# Patient Record
Sex: FEMALE | Race: WHITE | NOT HISPANIC OR LATINO
[De-identification: names, ages, dates, MRNs, and addresses within clinical notes are randomized per-mention and may not be internally consistent; named-entity substitution may affect disease eponyms.]

---

## 2017-01-25 ENCOUNTER — CHART COPY (OUTPATIENT)
Age: 47
End: 2017-01-25

## 2017-02-10 ENCOUNTER — CHART COPY (OUTPATIENT)
Age: 47
End: 2017-02-10

## 2017-03-15 ENCOUNTER — APPOINTMENT (OUTPATIENT)
Dept: MRI IMAGING | Facility: CLINIC | Age: 47
End: 2017-03-15

## 2017-03-15 ENCOUNTER — FORM ENCOUNTER (OUTPATIENT)
Age: 47
End: 2017-03-15

## 2017-03-16 ENCOUNTER — OUTPATIENT (OUTPATIENT)
Dept: OUTPATIENT SERVICES | Facility: HOSPITAL | Age: 47
LOS: 1 days | End: 2017-03-16

## 2017-03-16 ENCOUNTER — APPOINTMENT (OUTPATIENT)
Dept: MRI IMAGING | Facility: HOSPITAL | Age: 47
End: 2017-03-16

## 2017-03-16 DIAGNOSIS — D25.9 LEIOMYOMA OF UTERUS, UNSPECIFIED: ICD-10-CM

## 2017-03-30 ENCOUNTER — APPOINTMENT (OUTPATIENT)
Dept: INTERVENTIONAL RADIOLOGY/VASCULAR | Facility: CLINIC | Age: 47
End: 2017-03-30

## 2017-03-30 VITALS
RESPIRATION RATE: 16 BRPM | OXYGEN SATURATION: 99 % | WEIGHT: 108 LBS | DIASTOLIC BLOOD PRESSURE: 76 MMHG | HEART RATE: 78 BPM | HEIGHT: 61 IN | SYSTOLIC BLOOD PRESSURE: 125 MMHG | BODY MASS INDEX: 20.39 KG/M2

## 2017-03-30 DIAGNOSIS — Z87.19 PERSONAL HISTORY OF OTHER DISEASES OF THE DIGESTIVE SYSTEM: ICD-10-CM

## 2017-03-30 DIAGNOSIS — Z83.3 FAMILY HISTORY OF DIABETES MELLITUS: ICD-10-CM

## 2017-03-30 RX ORDER — MULTIVITAMIN
TABLET ORAL
Refills: 0 | Status: ACTIVE | COMMUNITY

## 2017-03-30 RX ORDER — CHROMIUM 200 MCG
TABLET ORAL
Refills: 0 | Status: ACTIVE | COMMUNITY

## 2017-05-12 ENCOUNTER — OUTPATIENT (OUTPATIENT)
Dept: OUTPATIENT SERVICES | Facility: HOSPITAL | Age: 47
LOS: 1 days | End: 2017-05-12

## 2017-05-12 VITALS
RESPIRATION RATE: 16 BRPM | HEIGHT: 60 IN | SYSTOLIC BLOOD PRESSURE: 130 MMHG | WEIGHT: 110.01 LBS | OXYGEN SATURATION: 99 % | TEMPERATURE: 99 F | DIASTOLIC BLOOD PRESSURE: 70 MMHG | HEART RATE: 66 BPM

## 2017-05-12 DIAGNOSIS — D25.9 LEIOMYOMA OF UTERUS, UNSPECIFIED: ICD-10-CM

## 2017-05-12 DIAGNOSIS — Z90.49 ACQUIRED ABSENCE OF OTHER SPECIFIED PARTS OF DIGESTIVE TRACT: Chronic | ICD-10-CM

## 2017-05-12 DIAGNOSIS — Z90.89 ACQUIRED ABSENCE OF OTHER ORGANS: Chronic | ICD-10-CM

## 2017-05-12 DIAGNOSIS — R22.9 LOCALIZED SWELLING, MASS AND LUMP, UNSPECIFIED: Chronic | ICD-10-CM

## 2017-05-12 LAB
APTT BLD: 32.6 SEC — SIGNIFICANT CHANGE UP (ref 27.5–37.4)
BUN SERPL-MCNC: 9 MG/DL — SIGNIFICANT CHANGE UP (ref 7–23)
CALCIUM SERPL-MCNC: 9.3 MG/DL — SIGNIFICANT CHANGE UP (ref 8.4–10.5)
CHLORIDE SERPL-SCNC: 102 MMOL/L — SIGNIFICANT CHANGE UP (ref 98–107)
CO2 SERPL-SCNC: 25 MMOL/L — SIGNIFICANT CHANGE UP (ref 22–31)
CREAT SERPL-MCNC: 0.61 MG/DL — SIGNIFICANT CHANGE UP (ref 0.5–1.3)
GLUCOSE SERPL-MCNC: 82 MG/DL — SIGNIFICANT CHANGE UP (ref 70–99)
HCG SERPL-ACNC: < 5 MIU/ML — SIGNIFICANT CHANGE UP
HCT VFR BLD CALC: 36.9 % — SIGNIFICANT CHANGE UP (ref 34.5–45)
HGB BLD-MCNC: 11.6 G/DL — SIGNIFICANT CHANGE UP (ref 11.5–15.5)
INR BLD: 1.01 — SIGNIFICANT CHANGE UP (ref 0.88–1.17)
MCHC RBC-ENTMCNC: 25.2 PG — LOW (ref 27–34)
MCHC RBC-ENTMCNC: 31.4 % — LOW (ref 32–36)
MCV RBC AUTO: 80 FL — SIGNIFICANT CHANGE UP (ref 80–100)
PLATELET # BLD AUTO: 228 K/UL — SIGNIFICANT CHANGE UP (ref 150–400)
PMV BLD: 12 FL — SIGNIFICANT CHANGE UP (ref 7–13)
POTASSIUM SERPL-MCNC: 3.9 MMOL/L — SIGNIFICANT CHANGE UP (ref 3.5–5.3)
POTASSIUM SERPL-SCNC: 3.9 MMOL/L — SIGNIFICANT CHANGE UP (ref 3.5–5.3)
PROTHROM AB SERPL-ACNC: 11.3 SEC — SIGNIFICANT CHANGE UP (ref 9.8–13.1)
RBC # BLD: 4.61 M/UL — SIGNIFICANT CHANGE UP (ref 3.8–5.2)
RBC # FLD: 14.2 % — SIGNIFICANT CHANGE UP (ref 10.3–14.5)
SODIUM SERPL-SCNC: 140 MMOL/L — SIGNIFICANT CHANGE UP (ref 135–145)
WBC # BLD: 5.16 K/UL — SIGNIFICANT CHANGE UP (ref 3.8–10.5)
WBC # FLD AUTO: 5.16 K/UL — SIGNIFICANT CHANGE UP (ref 3.8–10.5)

## 2017-05-12 NOTE — H&P PST ADULT - PROBLEM SELECTOR PLAN 1
scheduled uterine artery embolization on 5/24/17.  preop instructions, gi prophylaxis given  pt verbalized understanding  ucg on admit

## 2017-05-12 NOTE — H&P PST ADULT - PSH
History of appendectomy  1986  History of tonsillectomy  1979  Mass  excision  scalp cyst 2009 & 2016

## 2017-05-12 NOTE — H&P PST ADULT - HISTORY OF PRESENT ILLNESS
47y/o female presents for preop eval for scheduled uterine artery embolization on 5/24/17.  Pt with h/o excessive menstruation.  Preop dx fibroids.

## 2017-05-12 NOTE — H&P PST ADULT - FAMILY HISTORY
Mother  Still living? Yes, Estimated age: Age Unknown  Family history of hypertension, Age at diagnosis: Age Unknown  Family history of diabetes mellitus, Age at diagnosis: Age Unknown

## 2017-05-12 NOTE — H&P PST ADULT - NSANTHOSAYNRD_GEN_A_CORE
No. MILI screening performed.  STOP BANG Legend: 0-2 = LOW Risk; 3-4 = INTERMEDIATE Risk; 5-8 = HIGH Risk

## 2017-05-24 ENCOUNTER — INPATIENT (INPATIENT)
Facility: HOSPITAL | Age: 47
LOS: 1 days | Discharge: HOME | End: 2017-05-26
Attending: RADIOLOGY | Admitting: RADIOLOGY
Payer: COMMERCIAL

## 2017-05-24 VITALS
RESPIRATION RATE: 17 BRPM | DIASTOLIC BLOOD PRESSURE: 66 MMHG | OXYGEN SATURATION: 100 % | HEART RATE: 60 BPM | WEIGHT: 110.01 LBS | SYSTOLIC BLOOD PRESSURE: 129 MMHG | TEMPERATURE: 97 F | HEIGHT: 61 IN

## 2017-05-24 DIAGNOSIS — D25.9 LEIOMYOMA OF UTERUS, UNSPECIFIED: ICD-10-CM

## 2017-05-24 DIAGNOSIS — Z90.89 ACQUIRED ABSENCE OF OTHER ORGANS: Chronic | ICD-10-CM

## 2017-05-24 DIAGNOSIS — R22.9 LOCALIZED SWELLING, MASS AND LUMP, UNSPECIFIED: Chronic | ICD-10-CM

## 2017-05-24 DIAGNOSIS — Z90.49 ACQUIRED ABSENCE OF OTHER SPECIFIED PARTS OF DIGESTIVE TRACT: Chronic | ICD-10-CM

## 2017-05-24 LAB
APPEARANCE UR: CLEAR — SIGNIFICANT CHANGE UP
BILIRUB UR-MCNC: NEGATIVE — SIGNIFICANT CHANGE UP
BLOOD UR QL VISUAL: NEGATIVE — SIGNIFICANT CHANGE UP
COLOR SPEC: SIGNIFICANT CHANGE UP
GLUCOSE UR-MCNC: NEGATIVE — SIGNIFICANT CHANGE UP
HCG UR-SCNC: NEGATIVE — SIGNIFICANT CHANGE UP
KETONES UR-MCNC: NEGATIVE — SIGNIFICANT CHANGE UP
LEUKOCYTE ESTERASE UR-ACNC: NEGATIVE — SIGNIFICANT CHANGE UP
NITRITE UR-MCNC: NEGATIVE — SIGNIFICANT CHANGE UP
PH UR: 6.5 — SIGNIFICANT CHANGE UP (ref 4.6–8)
PROT UR-MCNC: NEGATIVE — SIGNIFICANT CHANGE UP
RBC CASTS # UR COMP ASSIST: SIGNIFICANT CHANGE UP (ref 0–?)
SP GR SPEC: 1.01 — SIGNIFICANT CHANGE UP (ref 1–1.03)
SP GR UR: 1.01 — SIGNIFICANT CHANGE UP (ref 1–1.03)
SQUAMOUS # UR AUTO: SIGNIFICANT CHANGE UP
UROBILINOGEN FLD QL: NORMAL E.U. — SIGNIFICANT CHANGE UP (ref 0.1–0.2)
WBC UR QL: SIGNIFICANT CHANGE UP (ref 0–?)

## 2017-05-24 PROCEDURE — 36247 INS CATH ABD/L-EXT ART 3RD: CPT

## 2017-05-24 PROCEDURE — 37243 VASC EMBOLIZE/OCCLUDE ORGAN: CPT

## 2017-05-24 RX ORDER — HYDROMORPHONE HYDROCHLORIDE 2 MG/ML
30 INJECTION INTRAMUSCULAR; INTRAVENOUS; SUBCUTANEOUS
Qty: 0 | Refills: 0 | Status: DISCONTINUED | OUTPATIENT
Start: 2017-05-24 | End: 2017-05-25

## 2017-05-24 RX ORDER — KETOROLAC TROMETHAMINE 30 MG/ML
15 SYRINGE (ML) INJECTION EVERY 6 HOURS
Qty: 0 | Refills: 0 | Status: DISCONTINUED | OUTPATIENT
Start: 2017-05-24 | End: 2017-05-25

## 2017-05-24 RX ORDER — METOCLOPRAMIDE HCL 10 MG
10 TABLET ORAL EVERY 4 HOURS
Qty: 0 | Refills: 0 | Status: DISCONTINUED | OUTPATIENT
Start: 2017-05-24 | End: 2017-05-26

## 2017-05-24 RX ORDER — SODIUM CHLORIDE 9 MG/ML
1000 INJECTION INTRAMUSCULAR; INTRAVENOUS; SUBCUTANEOUS
Qty: 0 | Refills: 0 | Status: DISCONTINUED | OUTPATIENT
Start: 2017-05-24 | End: 2017-05-25

## 2017-05-24 RX ORDER — DIPHENHYDRAMINE HCL 50 MG
25 CAPSULE ORAL EVERY 4 HOURS
Qty: 0 | Refills: 0 | Status: DISCONTINUED | OUTPATIENT
Start: 2017-05-24 | End: 2017-05-25

## 2017-05-24 RX ORDER — ONDANSETRON 8 MG/1
4 TABLET, FILM COATED ORAL EVERY 6 HOURS
Qty: 0 | Refills: 0 | Status: DISCONTINUED | OUTPATIENT
Start: 2017-05-24 | End: 2017-05-25

## 2017-05-24 RX ORDER — HYDROMORPHONE HYDROCHLORIDE 2 MG/ML
0.5 INJECTION INTRAMUSCULAR; INTRAVENOUS; SUBCUTANEOUS
Qty: 0 | Refills: 0 | Status: DISCONTINUED | OUTPATIENT
Start: 2017-05-24 | End: 2017-05-25

## 2017-05-24 RX ORDER — CEFOTETAN DISODIUM 1 G
1 VIAL (EA) INJECTION EVERY 12 HOURS
Qty: 0 | Refills: 0 | Status: COMPLETED | OUTPATIENT
Start: 2017-05-24 | End: 2017-05-25

## 2017-05-24 RX ORDER — DIPHENHYDRAMINE HCL 50 MG
12.5 CAPSULE ORAL EVERY 4 HOURS
Qty: 0 | Refills: 0 | Status: DISCONTINUED | OUTPATIENT
Start: 2017-05-24 | End: 2017-05-25

## 2017-05-24 RX ORDER — NALOXONE HYDROCHLORIDE 4 MG/.1ML
0.1 SPRAY NASAL
Qty: 0 | Refills: 0 | Status: DISCONTINUED | OUTPATIENT
Start: 2017-05-24 | End: 2017-05-25

## 2017-05-24 RX ADMIN — SODIUM CHLORIDE 100 MILLILITER(S): 9 INJECTION INTRAMUSCULAR; INTRAVENOUS; SUBCUTANEOUS at 14:08

## 2017-05-24 RX ADMIN — SODIUM CHLORIDE 100 MILLILITER(S): 9 INJECTION INTRAMUSCULAR; INTRAVENOUS; SUBCUTANEOUS at 15:41

## 2017-05-24 RX ADMIN — Medication 15 MILLIGRAM(S): at 23:11

## 2017-05-24 RX ADMIN — Medication 15 MILLIGRAM(S): at 17:44

## 2017-05-24 RX ADMIN — Medication 10 MILLIGRAM(S): at 20:16

## 2017-05-24 RX ADMIN — HYDROMORPHONE HYDROCHLORIDE 30 MILLILITER(S): 2 INJECTION INTRAMUSCULAR; INTRAVENOUS; SUBCUTANEOUS at 12:40

## 2017-05-24 RX ADMIN — HYDROMORPHONE HYDROCHLORIDE 30 MILLILITER(S): 2 INJECTION INTRAMUSCULAR; INTRAVENOUS; SUBCUTANEOUS at 19:16

## 2017-05-24 RX ADMIN — Medication 120 GRAM(S): at 17:44

## 2017-05-24 RX ADMIN — ONDANSETRON 4 MILLIGRAM(S): 8 TABLET, FILM COATED ORAL at 18:08

## 2017-05-24 RX ADMIN — SODIUM CHLORIDE 100 MILLILITER(S): 9 INJECTION INTRAMUSCULAR; INTRAVENOUS; SUBCUTANEOUS at 19:16

## 2017-05-24 RX ADMIN — HYDROMORPHONE HYDROCHLORIDE 30 MILLILITER(S): 2 INJECTION INTRAMUSCULAR; INTRAVENOUS; SUBCUTANEOUS at 15:42

## 2017-05-24 NOTE — CHART NOTE - NSCHARTNOTEFT_GEN_A_CORE
Called to evaluate fully saturated dressing. Removed and changed dressing, no active bleeding seen from puncture site and no signs of pseudoaneurysm, puncture site hematoma, or infection at this time. Asked nurse to monitor dressing and Call for evaluation if re saturates.     d/w Dr. Green R4  Jaquan, R2

## 2017-05-24 NOTE — PATIENT PROFILE ADULT. - VISION (WITH CORRECTIVE LENSES IF THE PATIENT USUALLY WEARS THEM):
Partially impaired: cannot see medication labels or newsprint, but can see obstacles in path, and the surrounding layout; can count fingers at arm's length/wear glasses

## 2017-05-24 NOTE — PROGRESS NOTE ADULT - PROBLEM SELECTOR PLAN 1
Neuro: PCA and toradol for pain control  CV: Hemodynamically stable  Pulm: Saturating well on room air, encourage oob/amb  GI: Advance to regular diet  : monitor UOP  Heme: oob/amb/SCD for dvt ppx  Dispo: Continue routine post-op care

## 2017-05-24 NOTE — PROGRESS NOTE ADULT - SUBJECTIVE AND OBJECTIVE BOX
Post Operative Check    Patient is post op from [facility] and is doing well. Pain controlled. Tolerates clears. Denies CP/SOB. Denies NV. Has not been out of bed yet.      Vitals not recorded in sunrise. aVSS in IR recovery suite     No New Labs      Physical Exam  General: NAD AAOx3   Cards: RRR S1S2  Resp: CTAB  Abdomen: soft, non-tender, non distended  : no bleeding noted  Inc: right groin incision c/d/i  Ext: NTBL, pulses 2+ bilaterally

## 2017-05-25 RX ORDER — SENNA PLUS 8.6 MG/1
1 TABLET ORAL
Qty: 5 | Refills: 0 | OUTPATIENT
Start: 2017-05-25 | End: 2017-05-30

## 2017-05-25 RX ORDER — CHOLECALCIFEROL (VITAMIN D3) 125 MCG
1 CAPSULE ORAL
Qty: 0 | Refills: 0 | COMMUNITY

## 2017-05-25 RX ORDER — KETOROLAC TROMETHAMINE 30 MG/ML
30 SYRINGE (ML) INJECTION ONCE
Qty: 0 | Refills: 0 | Status: DISCONTINUED | OUTPATIENT
Start: 2017-05-25 | End: 2017-05-25

## 2017-05-25 RX ORDER — ACETAMINOPHEN 500 MG
975 TABLET ORAL EVERY 6 HOURS
Qty: 0 | Refills: 0 | Status: DISCONTINUED | OUTPATIENT
Start: 2017-05-25 | End: 2017-05-26

## 2017-05-25 RX ORDER — SODIUM CHLORIDE 9 MG/ML
1000 INJECTION INTRAMUSCULAR; INTRAVENOUS; SUBCUTANEOUS
Qty: 0 | Refills: 0 | Status: DISCONTINUED | OUTPATIENT
Start: 2017-05-25 | End: 2017-05-26

## 2017-05-25 RX ORDER — CEFOXITIN 1 G/1
1 INJECTION, POWDER, FOR SOLUTION INTRAVENOUS
Qty: 20 | Refills: 0 | OUTPATIENT
Start: 2017-05-25 | End: 2017-06-04

## 2017-05-25 RX ORDER — OXYCODONE HYDROCHLORIDE 5 MG/1
5 TABLET ORAL
Qty: 0 | Refills: 0 | Status: DISCONTINUED | OUTPATIENT
Start: 2017-05-25 | End: 2017-05-26

## 2017-05-25 RX ORDER — OXYCODONE HYDROCHLORIDE 5 MG/1
10 TABLET ORAL
Qty: 0 | Refills: 0 | Status: DISCONTINUED | OUTPATIENT
Start: 2017-05-25 | End: 2017-05-26

## 2017-05-25 RX ORDER — IBUPROFEN 200 MG
1 TABLET ORAL
Qty: 20 | Refills: 0 | OUTPATIENT
Start: 2017-05-25 | End: 2017-05-30

## 2017-05-25 RX ORDER — DOCUSATE SODIUM 100 MG
1 CAPSULE ORAL
Qty: 10 | Refills: 0 | OUTPATIENT
Start: 2017-05-25 | End: 2017-05-30

## 2017-05-25 RX ADMIN — Medication 975 MILLIGRAM(S): at 04:00

## 2017-05-25 RX ADMIN — Medication 975 MILLIGRAM(S): at 12:28

## 2017-05-25 RX ADMIN — Medication 15 MILLIGRAM(S): at 06:01

## 2017-05-25 RX ADMIN — OXYCODONE HYDROCHLORIDE 5 MILLIGRAM(S): 5 TABLET ORAL at 23:30

## 2017-05-25 RX ADMIN — SODIUM CHLORIDE 50 MILLILITER(S): 9 INJECTION INTRAMUSCULAR; INTRAVENOUS; SUBCUTANEOUS at 22:11

## 2017-05-25 RX ADMIN — Medication 975 MILLIGRAM(S): at 11:58

## 2017-05-25 RX ADMIN — HYDROMORPHONE HYDROCHLORIDE 30 MILLILITER(S): 2 INJECTION INTRAMUSCULAR; INTRAVENOUS; SUBCUTANEOUS at 07:13

## 2017-05-25 RX ADMIN — Medication 30 MILLIGRAM(S): at 22:10

## 2017-05-25 RX ADMIN — Medication 15 MILLIGRAM(S): at 06:20

## 2017-05-25 RX ADMIN — Medication 10 MILLIGRAM(S): at 09:01

## 2017-05-25 RX ADMIN — Medication 30 MILLIGRAM(S): at 22:25

## 2017-05-25 RX ADMIN — Medication 120 GRAM(S): at 06:00

## 2017-05-25 RX ADMIN — Medication 975 MILLIGRAM(S): at 03:15

## 2017-05-25 RX ADMIN — Medication 975 MILLIGRAM(S): at 18:00

## 2017-05-25 RX ADMIN — Medication 120 GRAM(S): at 17:29

## 2017-05-25 RX ADMIN — SODIUM CHLORIDE 50 MILLILITER(S): 9 INJECTION INTRAMUSCULAR; INTRAVENOUS; SUBCUTANEOUS at 17:33

## 2017-05-25 RX ADMIN — OXYCODONE HYDROCHLORIDE 5 MILLIGRAM(S): 5 TABLET ORAL at 18:00

## 2017-05-25 RX ADMIN — Medication 975 MILLIGRAM(S): at 23:30

## 2017-05-25 RX ADMIN — Medication 975 MILLIGRAM(S): at 17:27

## 2017-05-25 RX ADMIN — OXYCODONE HYDROCHLORIDE 5 MILLIGRAM(S): 5 TABLET ORAL at 17:27

## 2017-05-25 RX ADMIN — ONDANSETRON 4 MILLIGRAM(S): 8 TABLET, FILM COATED ORAL at 07:43

## 2017-05-25 RX ADMIN — Medication 10 MILLIGRAM(S): at 23:30

## 2017-05-25 RX ADMIN — Medication 10 MILLIGRAM(S): at 13:52

## 2017-05-25 NOTE — DISCHARGE NOTE ADULT - CARE PLAN
Principal Discharge DX:	Uterine leiomyoma, unspecified location  Goal:	pain control  Instructions for follow-up, activity and diet:	Take pain medication as prescribed for pain. Regular diet as tolerated. No heavy lifting or strenuous activity. No bathing for 3 days, you may shower. No [place], tampons, NPV for 3 weeks. No Gym for 3 weeks. Return to work in 2 weeks. Do not drive while taking Percocet. Principal Discharge DX:	Uterine leiomyoma, unspecified location  Goal:	pain control  Instructions for follow-up, activity and diet:	Take pain medication as prescribed for pain. Regular diet as tolerated. No heavy lifting or strenuous activity. No bathing for 3 days, you may shower. No [place], tampons, NPV for 3 weeks. No Gym for 3 weeks. Return to work in 2 weeks. Do not drive while taking Percocet. Principal Discharge DX:	Uterine leiomyoma, unspecified location  Goal:	pain control  Instructions for follow-up, activity and diet:	Take pain medication as prescribed for pain. Regular diet as tolerated. No heavy lifting or strenuous activity. No bathing for 3 days, you may shower. No [place], tampons, NPV for 3 weeks. No Gym for 3 weeks. Return to work in 2 weeks. Do not drive while taking Percocet.

## 2017-05-25 NOTE — PROVIDER CONTACT NOTE (OTHER) - ACTION/TREATMENT ORDERED:
MD to come assess pt MD to come assess pt. Dressing changed by MD, will notify if further bleeding noted

## 2017-05-25 NOTE — DISCHARGE NOTE ADULT - PATIENT PORTAL LINK FT
“You can access the [employer] Patient Portal, offered by [facility], by registering with the following website: [email or web address]”

## 2017-05-25 NOTE — DISCHARGE NOTE ADULT - PLAN OF CARE
pain control Take pain medication as prescribed for pain. Regular diet as tolerated. No heavy lifting or strenuous activity. No bathing for 3 days, you may shower. No [place], tampons, NPV for 3 weeks. No Gym for 3 weeks. Return to work in 2 weeks. Do not drive while taking Percocet.

## 2017-05-25 NOTE — PROGRESS NOTE ADULT - SUBJECTIVE AND OBJECTIVE BOX
Anesthesia Pain Management Service    SUBJECTIVE: Patient is doing well with IV PCA and no significant problems reported.    Pain Scale Score	At rest: ___ 	With Activity: ___ 	[X ] Refer to charted pain scores    THERAPY:    [ ] IV PCA Morphine		[ ] 5 mg/mL	[ ] 1 mg/mL  [X ] IV PCA Hydromorphone	[ ] 5 mg/mL	[X ] 1 mg/mL  [ ] IV PCA Fentanyl		[ ] 50 micrograms/mL    Demand dose __0.2_ lockout __6_ (minutes) Continuous Rate _0__ Total: _4mg__  Daily      MEDICATIONS  (STANDING):  cefoTEtan  IVPB 1Gram(s) IV Intermittent every 12 hours  acetaminophen   Tablet. 975milliGRAM(s) Oral every 6 hours  sodium chloride 0.9%. 1000milliLiter(s) IV Continuous <Continuous>    MEDICATIONS  (PRN):  metoclopramide Injectable 10milliGRAM(s) IV Push every 4 hours PRN Nausea and/or Vomiting  oxyCODONE IR 5milliGRAM(s) Oral every 3 hours PRN Mild Pain (1 - 3) to Moderate Pain  oxyCODONE IR 10milliGRAM(s) Oral every 3 hours PRN Severe Pain (7 - 10)      OBJECTIVE:    Sedation Score:	[ X] Alert	[ ] Drowsy 	[ ] Arousable	[ ] Asleep	[ ] Unresponsive    Side Effects:	[ ] None 	[X ] Nausea	[ ] Vomiting	[ ] Pruritus  		[ ] Other:    Vital Signs Last 24 Hrs  T(C): 37.1, Max: 37.1 (05-25 @ 13:49)  T(F): 98.8, Max: 98.8 (05-25 @ 13:49)  HR: 74 (68 - 81)  BP: 120/56 (112/50 - 130/58)  BP(mean): --  RR: 16 (16 - 17)  SpO2: 100% (100% - 100%)    ASSESSMENT/ PLAN    Therapy to  be:	[ ] Continue   [ X] Discontinued   [X ] Change to prn Analgesics    Documentation and Verification of current medications:   [X] Done	[ ] Not done, not elligible    Comments: PRN Oral/IV opioids and/or Adjuvant medication to be ordered at this point. Pt prefers to stop IV PCA.

## 2017-05-25 NOTE — PROGRESS NOTE ADULT - SUBJECTIVE AND OBJECTIVE BOX
ANESTHESIA POSTOP CHECK    46y Female POSTOP DAY 1 S/P     Vital Signs Last 24 Hrs  T(C): 37.1, Max: 37.1 (05-25 @ 13:49)  T(F): 98.8, Max: 98.8 (05-25 @ 13:49)  HR: 74 (68 - 81)  BP: 120/56 (112/50 - 130/58)  BP(mean): --  RR: 16 (16 - 17)  SpO2: 100% (100% - 100%)  I&O's Summary  I & Os for 24h ending 25 May 2017 07:00  =============================================  IN: 1200 ml / OUT: 2060 ml / NET: -860 ml    I & Os for current day (as of 25 May 2017 17:11)  =============================================  IN: 0 ml / OUT: 850 ml / NET: -850 ml      [X ] NO APPARENT ANESTHESIA COMPLICATIONS      Comments:

## 2017-05-25 NOTE — DISCHARGE NOTE ADULT - MEDICATION SUMMARY - MEDICATIONS TO TAKE
I will START or STAY ON the medications listed below when I get home from the hospital:     mg oral tablet  -- 1 tab(s) by mouth every 6 hours, As Needed . Take around the clock for 3 days. After meals. Continue for mild pain.  -- Do not take this drug if you are pregnant.  It is very important that you take or use this exactly as directed.  Do not skip doses or discontinue unless directed by your doctor.  May cause drowsiness or dizziness.  Obtain medical advice before taking any non-prescription drugs as some may affect the action of this medication.  Take with food or milk.    -- Indication: For Uterine leiomyoma    Percocet 5/325 325 mg-5 mg oral tablet  -- 1 tab(s) by mouth every 6 hours, As Needed MDD:6 tabs. For moderate pain.  -- Caution federal law prohibits the transfer of this drug to any person other  than the person for whom it was prescribed.  May cause drowsiness.  Alcohol may intensify this effect.  Use care when operating dangerous machinery.  This prescription cannot be refilled.  This product contains acetaminophen.  Do not use  with any other product containing acetaminophen to prevent possible liver damage.  Using more of this medication than prescribed may cause serious breathing problems.    -- Indication: For Uterine leiomyoma    Ceftin 500 mg oral tablet  -- 1 tab(s) by mouth every 12 hours. Complete the course of antibiotics.  -- Finish all this medication unless otherwise directed by prescriber.  Medication should be taken with plenty of water.  Take with food or milk.    -- Indication: For Uterine leiomyoma    Senna 8.6 mg oral tablet  -- 1 tab(s) by mouth once a day (at bedtime), As Needed. Take until normal BM  -- Indication: For Uterine leiomyoma    Colace sodium 100 mg oral capsule  -- 1 cap(s) by mouth 2 times a day, As Needed. Take until normal BM  -- Medication should be taken with plenty of water.    -- Indication: For Uterine leiomyoma

## 2017-05-25 NOTE — CHART NOTE - NSCHARTNOTEFT_GEN_A_CORE
Patient status post uterine artery embolization on 4/24. Called by surgical team that patient had soaked through right groin dressing.     Evaluated patient at bedside, by which time gauze had already been removed. Small amount of blood seen oozing from right groin puncture site, without evidence of hematoma. Pressure applied to site, and a new V+Pad with hemostasis was applied with good result. Patient without further complaints.     Patient and RN instructed to recontact team if bleeding recurs or if patient soaks through dressing again. Patient status post uterine artery embolization on 5/24. Called by surgical team that patient had soaked through right groin dressing.     Evaluated patient at bedside, by which time gauze had already been removed. Small amount of blood seen oozing from right groin puncture site, without evidence of hematoma. Pressure applied to site, and a new V+Pad with hemostasis was applied with good result. Patient without further complaints.     Patient and RN instructed to recontact team if bleeding recurs or if patient soaks through dressing again.

## 2017-05-25 NOTE — PROGRESS NOTE ADULT - PROBLEM SELECTOR PLAN 1
Neuro: c/w pain meds prn as per IR  CV: Hemodynamically stable  Pulm: Saturating well on room air, encourage oob/amb  GI: regular diet as tolerated  : voiding spontaneously  Heme: c/w SCDs for DVT ppx  ID: afebrile. ancef x 3 doses for postop infectious ppx as per ID  Dispo: Continue routine post-op care as per IR

## 2017-05-25 NOTE — PROGRESS NOTE ADULT - SUBJECTIVE AND OBJECTIVE BOX
POD#1   HD#2    Patient seen and examined at bedside. Pt seen at bedside by IR overnight secondary to oozing through dressing on right groin puncture site from [place]. No acute complaints, pain moderately controlled.  Patient is ambulating. Reports mild nausea with one episode of emesis of fluids overnight which has since resolved. Tolerating crackers. Freely voiding. Denies CP, SOB, N/V, fevers, and chills.    Vital Signs Last 24 Hours  T(C): 36.8, Max: 36.8 (05-25 @ 06:04)  HR: 76 (60 - 81)  BP: 118/51 (112/50 - 129/66)  RR: 17 (16 - 17)  SpO2: 100% (100% - 100%)  Wt(kg): --    I&O's Summary    I & Os for current day (as of 25 May 2017 06:27)  =============================================  IN: 1200 ml / OUT: 2060 ml / NET: -860 ml      Physical Exam:  General: NAD  CV: RRR, S1, S2, no M/R/G  Lungs: CTAB  Abdomen: Soft, non-tender, non-distended, +BS  Incision: dressing in place in R groin Clean/Intact with 2 areas of dark clotted blood  Ext: NTBL    Labs:      MEDICATIONS  (STANDING):  HYDROmorphone PCA (1 mG/mL) 30milliLiter(s) PCA Continuous PCA Continuous  sodium chloride 0.9%. 1000milliLiter(s) IV Continuous <Continuous>  cefoTEtan  IVPB 1Gram(s) IV Intermittent every 12 hours  acetaminophen   Tablet. 975milliGRAM(s) Oral every 6 hours    MEDICATIONS  (PRN):  HYDROmorphone PCA (1 mG/mL) Rescue Clinician Bolus 0.5milliGRAM(s) IV Push every 15 minutes PRN for Pain Scale GREATER THAN 6  naloxone Injectable 0.1milliGRAM(s) IV Push every 3 minutes PRN For ANY of the following changes in patient status:  A. RR LESS THAN 10 breaths per minute, B. Oxygen saturation LESS THAN 90%, C. Sedation score of 6  ondansetron Injectable 4milliGRAM(s) IV Push every 6 hours PRN Nausea  diphenhydrAMINE   Capsule 25milliGRAM(s) Oral every 4 hours PRN Pruritus  diphenhydrAMINE   Injectable 12.5milliGRAM(s) IV Push every 4 hours PRN Pruritus  metoclopramide Injectable 10milliGRAM(s) IV Push every 4 hours PRN Nausea and/or Vomiting

## 2017-05-25 NOTE — DISCHARGE NOTE ADULT - HOSPITAL COURSE
46 year old with symptomatic uterine fibroids.  S/p UAE 05/25/2017.  Underwent procedure without any complications and had uneventful recovery. Her right groin puncture was well healed without hematoma.  Upon discharge she was voiding, tolerating reg diet without n/v, ambulating, and her pain was well controlled.  	  I – Stop was checked. 46 year old with symptomatic uterine fibroids.  S/p UAE 05/25/2017.  Underwent procedure without any complications. First night postop patient was seen due to oozing at right groin puncture site which was resolved with manual compression.  Upon discharge she was voiding, tolerating reg diet without n/v, ambulating, and her pain was well controlled.  	  I – Stop was checked.

## 2017-05-25 NOTE — DISCHARGE NOTE ADULT - CARE PROVIDER_API CALL
Jos Guillen), Diagnostic Radiology; [facility]  [id number] [place] [address]  Phone: [phone]  Fax: [phone]

## 2017-05-26 VITALS
SYSTOLIC BLOOD PRESSURE: 114 MMHG | HEART RATE: 74 BPM | DIASTOLIC BLOOD PRESSURE: 54 MMHG | RESPIRATION RATE: 18 BRPM | TEMPERATURE: 98 F | OXYGEN SATURATION: 100 %

## 2017-05-26 RX ADMIN — Medication 975 MILLIGRAM(S): at 00:05

## 2017-05-26 RX ADMIN — Medication 975 MILLIGRAM(S): at 06:20

## 2017-05-26 RX ADMIN — Medication 975 MILLIGRAM(S): at 05:32

## 2017-05-26 RX ADMIN — OXYCODONE HYDROCHLORIDE 5 MILLIGRAM(S): 5 TABLET ORAL at 00:05

## 2017-05-26 NOTE — PROGRESS NOTE ADULT - SUBJECTIVE AND OBJECTIVE BOX
POD#2   HD#3    Patient seen and examined at bedside, no acute overnight events. No acute complaints, pain well controlled.  Patient is ambulating, passing flatus, voiding spontaneously, and tolerating regular diet. Denies CP, SOB, N/V, fevers, and chills.    Vital Signs Last 24 Hours  T(C): 36.8, Max: 37.1 (05-25 @ 13:49)  HR: 68 (64 - 74)  BP: 116/51 (112/56 - 130/58)  RR: 18 (16 - 18)  SpO2: 98% (98% - 100%)    I&O's Summary  I & Os for 24h ending 25 May 2017 07:00  =============================================  IN: 1200 ml / OUT: 2060 ml / NET: -860 ml    I & Os for current day (as of 26 May 2017 06:38)  =============================================  IN: 2230 ml / OUT: 1750 ml / NET: 480 ml    Physical Exam:  General: NAD  CV: NR, RR, S1, S2, no M/R/G  Lungs: CTA-B  Abdomen: Soft, non-tender, non-distended, +BS  Incision: covered by bandage, CDI  Ext: No pain or swelling    MEDICATIONS  (STANDING):  acetaminophen   Tablet. 975milliGRAM(s) Oral every 6 hours  sodium chloride 0.9%. 1000milliLiter(s) IV Continuous <Continuous>    MEDICATIONS  (PRN):  metoclopramide Injectable 10milliGRAM(s) IV Push every 4 hours PRN Nausea and/or Vomiting  oxyCODONE IR 5milliGRAM(s) Oral every 3 hours PRN Mild Pain (1 - 3) to Moderate Pain  oxyCODONE IR 10milliGRAM(s) Oral every 3 hours PRN Severe Pain (7 - 10)

## 2017-05-26 NOTE — PROGRESS NOTE ADULT - PROBLEM SELECTOR PLAN 1
Neuro: c/w po pain meds  CV: Hemodynamically stable  Pulm: Saturating well on room air, encourage oob/amb  GI: c/w regular diet  : voiding spontaneously  Heme: SCDs for DVT ppx  Dispo: Continue routine post-op care, discharge planning

## 2017-06-15 ENCOUNTER — APPOINTMENT (OUTPATIENT)
Dept: INTERVENTIONAL RADIOLOGY/VASCULAR | Facility: CLINIC | Age: 47
End: 2017-06-15

## 2017-06-15 VITALS
WEIGHT: 108 LBS | HEART RATE: 82 BPM | HEIGHT: 61 IN | BODY MASS INDEX: 20.39 KG/M2 | RESPIRATION RATE: 16 BRPM | OXYGEN SATURATION: 100 % | SYSTOLIC BLOOD PRESSURE: 116 MMHG | DIASTOLIC BLOOD PRESSURE: 74 MMHG

## 2017-06-15 DIAGNOSIS — D25.9 LEIOMYOMA OF UTERUS, UNSPECIFIED: ICD-10-CM

## 2017-06-30 ENCOUNTER — FORM ENCOUNTER (OUTPATIENT)
Age: 47
End: 2017-06-30

## 2017-07-01 ENCOUNTER — APPOINTMENT (OUTPATIENT)
Dept: MRI IMAGING | Facility: IMAGING CENTER | Age: 47
End: 2017-07-01

## 2017-07-01 ENCOUNTER — OUTPATIENT (OUTPATIENT)
Dept: OUTPATIENT SERVICES | Facility: HOSPITAL | Age: 47
LOS: 1 days | End: 2017-07-01
Payer: COMMERCIAL

## 2017-07-01 DIAGNOSIS — Z00.8 ENCOUNTER FOR OTHER GENERAL EXAMINATION: ICD-10-CM

## 2017-07-01 DIAGNOSIS — R22.9 LOCALIZED SWELLING, MASS AND LUMP, UNSPECIFIED: Chronic | ICD-10-CM

## 2017-07-01 DIAGNOSIS — Z90.49 ACQUIRED ABSENCE OF OTHER SPECIFIED PARTS OF DIGESTIVE TRACT: Chronic | ICD-10-CM

## 2017-07-01 DIAGNOSIS — Z90.89 ACQUIRED ABSENCE OF OTHER ORGANS: Chronic | ICD-10-CM

## 2017-07-01 PROCEDURE — A9585: CPT

## 2017-07-01 PROCEDURE — 72197 MRI PELVIS W/O & W/DYE: CPT

## 2018-06-16 ENCOUNTER — APPOINTMENT (OUTPATIENT)
Dept: OPHTHALMOLOGY | Facility: CLINIC | Age: 48
End: 2018-06-16
Payer: COMMERCIAL

## 2018-06-16 PROCEDURE — 92004 COMPRE OPH EXAM NEW PT 1/>: CPT

## 2018-06-16 PROCEDURE — 92015 DETERMINE REFRACTIVE STATE: CPT

## 2018-06-16 PROCEDURE — 92250 FUNDUS PHOTOGRAPHY W/I&R: CPT

## 2018-07-28 ENCOUNTER — APPOINTMENT (OUTPATIENT)
Dept: OPHTHALMOLOGY | Facility: CLINIC | Age: 48
End: 2018-07-28
Payer: COMMERCIAL

## 2018-07-28 PROCEDURE — 76514 ECHO EXAM OF EYE THICKNESS: CPT

## 2018-07-28 PROCEDURE — 92083 EXTENDED VISUAL FIELD XM: CPT

## 2018-07-28 PROCEDURE — 92133 CPTRZD OPH DX IMG PST SGM ON: CPT

## 2018-07-28 PROCEDURE — 92012 INTRM OPH EXAM EST PATIENT: CPT

## 2021-02-10 NOTE — DISCHARGE NOTE ADULT - NS AS ACTIVITY OBS
This is a 56 year old White  female       Chief Complaint   Patient presents with   • Follow-up     GOA, chronic hand pain, xerostomia       PROBLEM LIST:  1.  Chronic bilateral hand, particularly worse in her PIPs, 1st CMC squaring,  bony hypertrophy of first IP. Brief morning stiffness consistent with osteoarthritis, negative rheumatoid factor, Anti-CCP. Rheumatoid arthritis is unlikely even with family history of rheumatoid arthritis.      X-ray of hands showed mild osteoarthritis of 1st CMCs.    Unresponsive to meloxicam.    On diclofenac.    2. Chronic lower back pain status post fusion L2-L3    3. 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 Long-term use of NSAIDs.   Meloxicam is no longer effective.    On diclofenac.    5. Sicca syndrome with history of enlargement of parotid glands but no serological evidence of Sjogren's syndrome      6. Other medical problems include:  Patient Active Problem List   Diagnosis   • Cerebral aneurysm   • Benign essential HTN   • Anxiety   • Chronic low back pain   • History of cerebral aneurysm repair   • Urinary frequency   • Urinary urgency   • Xerostomia   • Other insomnia   • Chronic hand pain   • Generalized osteoarthritis   • Encounter for long-term (current) use of NSAIDs   • Non-healing skin lesion       Past Surgical History:   Procedure Laterality Date   • Brain aneurysm surgery  2010   • Colonoscopy  8-29-13   • Hemorrhoidectomy     • Hysterectomy  04/2011   • Knee surgery  06/2012   • Spine surgery         Allergies as of 02/10/2021 - Reviewed 02/10/2021   Allergen Reaction Noted   • Amoxicillin-pot clavulanate DIARRHEA 03/03/2014   • Clindamycin DIARRHEA 04/29/2019   • Glucosamine chondr DIARRHEA 10/13/2014       Current Outpatient Medications   Medication Sig Dispense Refill   • psyllium (Metamucil) 28 % packet Take 1 packet by mouth 2 times daily.     • escitalopram (LEXAPRO) 20 MG tablet TAKE 1 TABLET BY MOUTH DAILY 30 tablet 0   • oxybutynin  (DITROPAN-XL) 5 MG 24 hr tablet Take 1 tablet by mouth daily. 90 tablet 1   • Temazepam 30 MG capsule TAKE 1 CAPSULE BY MOUTH EVERY NIGHT AS NEEDED FOR SLEEP 30 capsule 0   • diclofenac (VOLTAREN) 75 MG EC tablet TAKE 1 TABLET BY MOUTH TWICE DAILY 180 tablet 0   • acetaminophen (TYLENOL) 500 MG tablet Take 1 tablet by mouth every 8 hours. 30 tablet 0   • fluconazole (DIFLUCAN) 150 MG tablet Take 1 tablet by mouth 2 days a week. 48 tablet 0   • ESTROGENS CONJ SYNTHETIC A PO Take 0.5 mg by mouth 2 days a week.     • ACIDOPHILUS LACTOBACILLUS PO      • predniSONE (DELTASONE) 10 MG tablet Take 1 tablet by mouth daily. Take 2 tabs at onset of migraine and update doctor next day 20 tablet 0   • Multiple Vitamins-Minerals (EMERGEN-C IMMUNE PO) Drink twice daily     • vitamin E 400 UNIT capsule Take 400 Units by mouth daily.     • VITAMIN D, CHOLECALCIFEROL, PO 2000 units daily     • BIOTIN 5000 PO daily     • CALCIUM CARBONATE PO 2 daily viactiv chews     • gabapentin (NEURONTIN) 300 MG capsule Take 1 capsule by mouth 3 times daily. 15 capsule 0   • oxyCODONE, IMM REL, (ROXICODONE) 5 MG immediate release tablet Take 1-2 tablets by mouth every 6 hours as needed for Pain. 20 tablet 0   • Pramoxine HCl, Perianal, (Proctofoam) 1 % Foam Apply 1 Pump topically 3 times daily. 15 g 3     No current facility-administered medications for this visit.        FAMILY AND SOCIAL HISTORY:  Unchanged. Please see my previous note dated 10/30/2019 for more details.    INTERVAL HISTORY:  The patient returns on 2/10/2021 for follow up of the above-mentioned rheumatological problems.   She is currently on diclofenac without significant side effects. It is beneficial. She is having some pain in her left elbow today, it started as a pea-size lump that has since gone.     Since the last visit her symptoms have been getting better . She does  have joint pain, predominantly affecting fingers in DIP and 1st CMCs as well as back.  The pain has been  intermittent.  The pain is worse in the afternoon.  The pain is aching in nature. She says her pain is 4 /10.  The pain is aggravated by bending, weather/temperature changes and movement.  The pain is alleviated by rest and medication including Diclofenac. She does have joint swelling. She does  experience morning stiffness. It lasts 5 minutes.  She has difficulty with using hands to grasp small objects, dressing self, working sometimes.  She uses no assistive device. She does  experience fatigue.  She states the fatigue is mild. Denies fevers, chills or night sweats. Her weight has been stable.     Other complaints include nosebleeds, dryness of nose, dryness of mouth, and sexual difficulties.    Recent laboratory tests were reviewed with the patient, creatinine was 0.84 mg/dL with estimated GFR of 77 mL/min/1.73m2.    REVIEW OF SYSTEMS:  See history of present illness; otherwise all negative. I reviewed the form filled out by the patient.     PHYSICAL EXAMINATION:  General: Alert, awake, oriented times 3, not in acute distress. Pleasant and appears stated age.  Vital Signs: Blood pressure 130/80, pulse 60, height 5' 4.5\" (1.638 m), weight 60.8 kg.  Skin: No palpable purpura, psoriasis or livedo on the upper and lower extremities.    Dry skin.    HEENT:  Pink palpebral conjunctivae, anicteric sclerae, pupils reactive to light. No malar rash, oronasal ulcerations or parotid gland enlargement with dry buccal mucosa. No heliotrope rash. No scalp tenderness.   Eye and mouth dryness.    Neck: Supple, no carotid bruit, thyromegaly or lymphadenopathy with no palpable mass.   Lungs: No rales or wheezing.   Heart: No gallop, significant murmur or friction rub.   Abdomen: Soft, positive bowel sounds, no hepatosplenomegaly or tenderness.   Extremities: No bipedal edema, cyanosis or clubbing with palpable pedal pulses. No sclerodactyly or periungual erythema. Warm and well perfused.  Neurological: Did not show proximal muscle  weakness. Knee reflex 2+ bilaterally.  Musculoskeletal:    Bony hypertrophy of right 1st IP joint.    All distal interphalangeal joints, proximal interphalangeal joints and metacarpal phalangeal joints with good range of motion without synovial thickening.   Bilateral first CMC squaring tenderness.    Wrists and elbows with good range of motion without swelling.   Notable for localized tenderness over the lateral epicondyle of left elbow and proximal wrist extensor muscle mass, pain with resisted wrist extension with the elbow in full extension and pain with passive terminal wrist flexion with the elbow in full extension, consistent with lateral epicondylitis.  Shoulders with good range of motion without pain on motion.   Hips with good range of motion without pain on motion.   Knees without effusion. Normal range of motion.  Both knees with crepitus on motion.  Ankles are nontender without swelling. Normal range of motion.   Bilateral bunions.        LABS:  Lab Results   Component Value Date    WBC 5.2 10/12/2020    RBC 4.37 10/12/2020    HGB 13.7 10/12/2020    HCT 40.3 10/12/2020    MCV 92.2 10/12/2020    MCH 31.4 10/12/2020    MCHC 34.0 10/12/2020    RDWCV 14.1 10/12/2020    SEG 62 10/12/2020    TLYMPH 27 10/12/2020    PMON 8 10/12/2020    PEOS 2 10/12/2020    PBASO 1 10/12/2020    ANEUT 3.3 10/12/2020    ALYMS 1.4 10/12/2020    PANCHO 0.4 10/12/2020    AEOS 0.1 10/12/2020    ABASO 0.0 10/12/2020     ESR (mm/hr)   Date Value   04/29/2019 9   04/06/2016 7   08/29/2014 7     RHEUMATOID FACTOR   Date Value Ref Range Status   08/15/2016 <10 <15 Units/mL Final   08/29/2014 <10 <15 Units/mL Final     C-REACTIVE PROTEIN (mg/dL)   Date Value   04/29/2019 <0.3   04/06/2016 <0.3   08/29/2014 0.5     Lab Results   Component Value Date    SODIUM 141 11/18/2020    POTASSIUM 4.6 11/18/2020    CHLORIDE 103 11/18/2020    CO2 33 (H) 11/18/2020    CREATININE 0.84 11/18/2020    TOTPROTEIN 7.3 11/18/2020    ALBUMIN 4.1 11/18/2020     CALCIUM 9.5 11/18/2020    BILIRUBIN 0.4 11/18/2020    ALKPT 71 11/18/2020    GPT 36 11/18/2020    BCRAT 21 11/18/2020    GLOB 3.2 11/18/2020    GFRNA 77 05/17/2019    GFRA 89 05/17/2019     Cyclic Citrul Pep AB    Date Value Ref Range Status   08/15/2016 6 <20 Units Final     Comment:     Approximately 70% of patients with Rheumatoid Arthritis(RA) are positive for CCP AB while only 2% of random blood donors are positive. The diagnostic value of CCP AB in juvenile RA patients has not been determined.         ASSESSMENT AND PLAN:  1. Early generalized osteoarthritis, including hands, feet and spine.  On diclofenac with good pain control.  Continue diclofenac 75 mg 1 b.i.d.    2.  Sicca syndrome but no serological evidence of Sjogren syndrome.  Continue supportive care.    3.  Long-term use of NSAIDs, monitoring labs yearly include a CBC and CMP.    4.  Continue calcium and vitamin-D supplementation.    5. Tennis elbow of left elbow  Education materials were given.  Rest. Rest and protect the injured or sore area. Stop, change, or take a break from any activity that may be causing your pain or soreness.    Ice. Cold will reduce pain and swelling. Apply an ice or cold pack right away to prevent or minimize swelling. Apply the ice or cold pack for 10 to 20 minutes, 3 or more times a day. After 48 to 72 hours, if swelling is gone, apply heat to the area that hurts. Do not apply ice or heat directly to the skin. Place a towel over the cold or heat pack before applying it to the skin.    Compression. Compression, or wrapping the injured or sore area with an elastic bandage (such as an Ace wrap), will help decrease swelling. Don't wrap it too tightly, because this can cause more swelling below the affected area. Loosen the bandage if it gets too tight. Signs that the bandage is too tight include numbness, tingling, increased pain, coolness, or swelling in the area below the bandage. Talk to your doctor if you think you need  to use a wrap for longer than 48 to 72 hours; a more serious problem may be present.    Elevation. Elevate the injured or sore area on pillows while applying ice and anytime you are sitting or lying down. Try to keep the area at or above the level of your heart to help minimize swelling.      I will see the patient back in 1 year for follow-up.  The patient was advised to call should any questions or concerns arise in the interim.     On 02/10/21, I, Martha Montes De Oca, scribed the services personally performed Clark Parsons MD    The documentation recorded by the scribe accurately and completely reflects the service(s) I personally performed and the decisions made by me.     Stairs allowed/Showering allowed/Walking-Indoors allowed/Walking-Outdoors allowed/Driving allowed/No Heavy lifting/straining

## 2021-04-11 ENCOUNTER — TRANSCRIPTION ENCOUNTER (OUTPATIENT)
Age: 51
End: 2021-04-11

## 2022-09-11 ENCOUNTER — NON-APPOINTMENT (OUTPATIENT)
Age: 52
End: 2022-09-11

## 2025-09-16 ENCOUNTER — NON-APPOINTMENT (OUTPATIENT)
Age: 55
End: 2025-09-16

## 2025-09-18 ENCOUNTER — APPOINTMENT (OUTPATIENT)
Dept: SURGERY | Facility: CLINIC | Age: 55
End: 2025-09-18
Payer: COMMERCIAL

## 2025-09-18 VITALS
DIASTOLIC BLOOD PRESSURE: 68 MMHG | OXYGEN SATURATION: 98 % | WEIGHT: 113 LBS | HEIGHT: 61 IN | HEART RATE: 86 BPM | SYSTOLIC BLOOD PRESSURE: 150 MMHG | BODY MASS INDEX: 21.34 KG/M2

## 2025-09-18 DIAGNOSIS — M79.89 OTHER SPECIFIED SOFT TISSUE DISORDERS: ICD-10-CM

## 2025-09-18 DIAGNOSIS — G43.909 MIGRAINE, UNSPECIFIED, NOT INTRACTABLE, W/OUT STATUS MIGRAINOSUS: ICD-10-CM

## 2025-09-18 PROCEDURE — 99203 OFFICE O/P NEW LOW 30 MIN: CPT

## 2025-09-18 RX ORDER — FAMOTIDINE 20 MG/1
20 TABLET, FILM COATED ORAL
Refills: 0 | Status: ACTIVE | COMMUNITY